# Patient Record
Sex: FEMALE | Race: WHITE | Employment: STUDENT | URBAN - METROPOLITAN AREA
[De-identification: names, ages, dates, MRNs, and addresses within clinical notes are randomized per-mention and may not be internally consistent; named-entity substitution may affect disease eponyms.]

---
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
## 2021-04-14 NOTE — ED PROVIDER NOTES
Repeat labs due The history is provided by the patient and a relative. No  was used. Vomiting This is a new problem. The current episode started yesterday. The problem occurs more than 10 times per day. The problem has not changed since onset. The emesis has an appearance of stomach contents. There has been no fever. Associated symptoms include abdominal pain, diarrhea, headaches, myalgias, cough, URI and headaches. Pertinent negatives include no chills, no fever, no sweats and no arthralgias. Yes, the patient is pregnant. Risk factors include ill contacts. Her pertinent negatives include no irritable bowel syndrome, no inflammatory bowel disease, no short gut syndrome, no bowel resection, no recent abdominal surgery, no malabsorption, no gastric bypass and no DM. History reviewed. No pertinent past medical history. History reviewed. No pertinent surgical history. History reviewed. No pertinent family history. Social History Socioeconomic History  Marital status: Not on file Spouse name: Not on file  Number of children: Not on file  Years of education: Not on file  Highest education level: Not on file Social Needs  Financial resource strain: Not on file  Food insecurity - worry: Not on file  Food insecurity - inability: Not on file  Transportation needs - medical: Not on file  Transportation needs - non-medical: Not on file Occupational History  Not on file Tobacco Use  Smoking status: Never Smoker  Smokeless tobacco: Never Used Substance and Sexual Activity  Alcohol use: No  
  Frequency: Never  Drug use: No  
 Sexual activity: Not on file Other Topics Concern  Not on file Social History Narrative  Not on file ALLERGIES: Patient has no known allergies. Review of Systems Constitutional: Negative for activity change, chills and fever.   
HENT: Negative for nosebleeds, sore throat, trouble swallowing and voice change. Eyes: Negative for visual disturbance. Respiratory: Positive for cough. Negative for shortness of breath. Cardiovascular: Negative for chest pain and palpitations. Gastrointestinal: Positive for abdominal pain, diarrhea and vomiting. Negative for constipation and nausea. Genitourinary: Negative for difficulty urinating, dysuria, hematuria and urgency. Musculoskeletal: Positive for myalgias. Negative for arthralgias, back pain, neck pain and neck stiffness. Skin: Negative for color change. Allergic/Immunologic: Negative for immunocompromised state. Neurological: Positive for headaches. Negative for dizziness, seizures, syncope, weakness, light-headedness and numbness. Psychiatric/Behavioral: Negative for behavioral problems, confusion, hallucinations, self-injury and suicidal ideas. Vitals:  
 01/29/19 6054 BP: 137/74 Pulse: 89 Resp: 20 Temp: 98.7 °F (37.1 °C) SpO2: 99% Weight: 103.7 kg (228 lb 9.9 oz) Height: 5' 5.75\" (1.67 m) Physical Exam  
Constitutional: She is oriented to person, place, and time. She appears well-developed and well-nourished. No distress. HENT:  
Head: Normocephalic and atraumatic. Eyes: Pupils are equal, round, and reactive to light. Neck: Normal range of motion. Neck supple. Cardiovascular: Normal rate, regular rhythm and normal heart sounds. Exam reveals no gallop and no friction rub. No murmur heard. Pulmonary/Chest: Effort normal and breath sounds normal. No respiratory distress. She has no wheezes. Abdominal: Soft. Bowel sounds are normal. She exhibits no distension. There is no tenderness. There is no rebound and no guarding. Musculoskeletal: Normal range of motion. Neurological: She is alert and oriented to person, place, and time. Skin: Skin is warm. No rash noted. She is not diaphoretic. Psychiatric: She has a normal mood and affect.  Her behavior is normal. Judgment and thought content normal.  
 Nursing note and vitals reviewed. MDM This is an 25year-old female is medical history, review of systems, physical exam as above, presenting with complaints of nausea vomiting, loose bowel movements, low grade temperatures without afebrile, in the setting of known sick contacts, and early pregnancy. Patient states she is approximately 15 weeks, confirmed by OB, denies vaginal bleeding, discharge, denies hematemesis, or hematochezia. 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 Physical exam is remarkable for a well appearing obese young female, in no acute distress, with clear breath sounds, soft nontender abdomen, regular rate and rhythm without murmurs gallops rubs, mildly erythematous posterior pharynx, without exudate, noted to be afebrile, without tachycardia or hypotension. Differential includes viral illness, gastroenteritis, food toxicity. Plan to obtain flu swab, CMP, CBC, UA, mag, UPT. We'll provide IV fluids, defer antiemetics, as patient is currently asymptomatic. We'll make a disposition based the patient's diagnostics and response to therapy. Procedures 8:12 AM 
Patient with hypo mag, otherwise unremarkable studies, symptoms improved, no n/v here in the ED. Will Dc home with instructions for PO fluids, zofran, PCP and OB f/u as needed, return precautions given.